# Patient Record
Sex: FEMALE | Race: WHITE | URBAN - NONMETROPOLITAN AREA
[De-identification: names, ages, dates, MRNs, and addresses within clinical notes are randomized per-mention and may not be internally consistent; named-entity substitution may affect disease eponyms.]

---

## 2021-12-08 ENCOUNTER — LAB OUTSIDE AN ENCOUNTER (OUTPATIENT)
Dept: URBAN - NONMETROPOLITAN AREA CLINIC 2 | Facility: CLINIC | Age: 41
End: 2021-12-08

## 2021-12-08 ENCOUNTER — WEB ENCOUNTER (OUTPATIENT)
Dept: URBAN - NONMETROPOLITAN AREA CLINIC 2 | Facility: CLINIC | Age: 41
End: 2021-12-08

## 2021-12-08 ENCOUNTER — OFFICE VISIT (OUTPATIENT)
Dept: URBAN - NONMETROPOLITAN AREA CLINIC 2 | Facility: CLINIC | Age: 41
End: 2021-12-08
Payer: COMMERCIAL

## 2021-12-08 DIAGNOSIS — R10.30 LOWER ABDOMINAL PAIN: ICD-10-CM

## 2021-12-08 DIAGNOSIS — K59.04 CHRONIC IDIOPATHIC CONSTIPATION: ICD-10-CM

## 2021-12-08 PROCEDURE — 99204 OFFICE O/P NEW MOD 45 MIN: CPT | Performed by: NURSE PRACTITIONER

## 2021-12-08 RX ORDER — SODIUM, POTASSIUM,MAG SULFATES 17.5-3.13G
177ML SOLUTION, RECONSTITUTED, ORAL ORAL DAILY
Qty: 177 ML | Refills: 0 | OUTPATIENT
Start: 2021-12-08 | End: 2021-12-09

## 2021-12-08 RX ORDER — LEVOTHYROXINE SODIUM 0.1 MG/1
1 TABLET IN THE MORNING ON AN EMPTY STOMACH TABLET ORAL ONCE A DAY
Status: ACTIVE | COMMUNITY

## 2021-12-08 RX ORDER — LINACLOTIDE 290 UG/1
1 CAPSULE AT LEAST 30 MINUTES BEFORE THE FIRST MEAL OF THE DAY ON AN EMPTY STOMACH CAPSULE, GELATIN COATED ORAL ONCE A DAY
Qty: 90 | Refills: 3 | OUTPATIENT
Start: 2021-12-08 | End: 2022-12-03

## 2021-12-08 NOTE — HPI-TODAY'S VISIT:
12/8/2021 Jaye Manuel is a 41 year old female with lower back and lower abdominal pain and discomfort. She started having heavy menstrual bleeding and lower back pain in April. She saw her GYN and had an US. This showed some thickening of her uterus and she was anemic. She had an ablation and given iron. Her bleeding improved but her pain did not change. She saw her GYN again 6 weeks ago and her labs showed normal Hbg so her iron was stopped. Her TSH was high and her synthroid was increased. Her US showed continues thickened endometrium. It was decided to watch for now but that she may need a hysterectomy. She wonders if other things could be causing it. She has had irregular BM all her life. She only has a BM every 5-7 days.Her stool is so large that it will clog the toliet- not from the amount but from the diameter. She denies any blood in her stool. She denies any changes in her weight or appetite. She has tried miralax in the past and it takes days for it to work. She has not noticed any change in her pain after a BM. She feels bloated all the time and will feel a twisting pain in her abdomen worse in the right lower quadrant. CS

## 2022-02-17 ENCOUNTER — OFFICE VISIT (OUTPATIENT)
Dept: URBAN - NONMETROPOLITAN AREA SURGERY CENTER 1 | Facility: SURGERY CENTER | Age: 42
End: 2022-02-17
Payer: COMMERCIAL

## 2022-02-17 DIAGNOSIS — R19.4 ALTERATION IN BOWEL ELIMINATION: ICD-10-CM

## 2022-02-17 PROCEDURE — G8907 PT DOC NO EVENTS ON DISCHARG: HCPCS | Performed by: INTERNAL MEDICINE

## 2022-02-17 PROCEDURE — 45378 DIAGNOSTIC COLONOSCOPY: CPT | Performed by: INTERNAL MEDICINE

## 2022-03-10 ENCOUNTER — DASHBOARD ENCOUNTERS (OUTPATIENT)
Age: 42
End: 2022-03-10

## 2022-03-11 ENCOUNTER — OFFICE VISIT (OUTPATIENT)
Dept: URBAN - METROPOLITAN AREA TELEHEALTH 2 | Facility: TELEHEALTH | Age: 42
End: 2022-03-11
Payer: COMMERCIAL

## 2022-03-11 DIAGNOSIS — R10.84 ABDOMINAL PAIN, GENERALIZED: ICD-10-CM

## 2022-03-11 DIAGNOSIS — K59.04 CHRONIC IDIOPATHIC CONSTIPATION: ICD-10-CM

## 2022-03-11 PROBLEM — 82934008: Status: ACTIVE | Noted: 2021-12-08

## 2022-03-11 PROCEDURE — 99213 OFFICE O/P EST LOW 20 MIN: CPT | Performed by: INTERNAL MEDICINE

## 2022-03-11 RX ORDER — LINACLOTIDE 290 UG/1
1 CAPSULE AT LEAST 30 MINUTES BEFORE THE FIRST MEAL OF THE DAY ON AN EMPTY STOMACH CAPSULE, GELATIN COATED ORAL ONCE A DAY
Qty: 90 | Refills: 3 | OUTPATIENT

## 2022-03-11 RX ORDER — LINACLOTIDE 290 UG/1
1 CAPSULE AT LEAST 30 MINUTES BEFORE THE FIRST MEAL OF THE DAY ON AN EMPTY STOMACH CAPSULE, GELATIN COATED ORAL ONCE A DAY
Qty: 90 | Refills: 3 | COMMUNITY
Start: 2021-12-08 | End: 2022-12-03

## 2022-03-11 RX ORDER — LEVOTHYROXINE SODIUM 0.1 MG/1
1 TABLET IN THE MORNING ON AN EMPTY STOMACH TABLET ORAL ONCE A DAY
COMMUNITY

## 2022-03-11 NOTE — HPI-TODAY'S VISIT:
3/11/2022 Jaye Manuel is here for f/u of lower back and lower abdominal pain and discomfort. She was having soem GYN issues and there was talk about a hysterectomy. She wonders if there was a GI issue as well. She only has a BM every 5-7 days. She was started on linzess 290mcg and had a colonoscopy with dilated colon otherwise normal. Today, she is no longer taking the linzess. She is concerned about taking a medication daily. She continues to have lower back pain and thinks it is her uterus. CS

## 2022-03-11 NOTE — HPI-OTHER HISTORIES
12/8/2021 Jaye Manuel is a 41 year old female with lower back and lower abdominal pain and discomfort. She started having heavy menstrual bleeding and lower back pain in April. She saw her GYN and had an US. This showed some thickening of her uterus and she was anemic. She had an ablation and given iron. Her bleeding improved but her pain did not change. She saw her GYN again 6 weeks ago and her labs showed normal Hbg so her iron was stopped. Her TSH was high and her synthroid was increased. Her US showed continues thickened endometrium. It was decided to watch for now but that she may need a hysterectomy. She wonders if other things could be causing it. She has had irregular BM all her life. She only has a BM every 5-7 days.Her stool is so large that it will clog the toliet- not from the amount but from the diameter. She denies any blood in her stool. She denies any changes in her weight or appetite. She has tried miralax in the past and it takes days for it to work. She has not noticed any change in her pain after a BM. She feels bloated all the time and will feel a twisting pain in her abdomen worse in the right lower quadrant. CS  2/17/2022 Colonoscopy: moderatey dilated colon secondary to chronic constipation otherwise normal

## 2022-06-08 ENCOUNTER — OFFICE VISIT (OUTPATIENT)
Dept: URBAN - NONMETROPOLITAN AREA CLINIC 2 | Facility: CLINIC | Age: 42
End: 2022-06-08

## 2023-06-13 ENCOUNTER — ERX REFILL RESPONSE (OUTPATIENT)
Dept: URBAN - NONMETROPOLITAN AREA CLINIC 2 | Facility: CLINIC | Age: 43
End: 2023-06-13

## 2023-06-13 RX ORDER — LINACLOTIDE 290 UG/1
1 CAPSULE AT LEAST 30 MINUTES BEFORE THE FIRST MEAL OF THE DAY ON AN EMPTY STOMACH CAPSULE, GELATIN COATED ORAL ONCE A DAY
Qty: 90 | Refills: 3 | OUTPATIENT

## 2023-06-13 RX ORDER — LINACLOTIDE 290 UG/1
TAKE ONE CAPSULE AT LEAST 30 MINUTES BEFORE THE FIRST MEAL OF THE DAY ON AN EMPTY STOMACH CAPSULE, GELATIN COATED ORAL
Qty: 90 CAPSULE | Refills: 0 | OUTPATIENT